# Patient Record
Sex: MALE | Race: BLACK OR AFRICAN AMERICAN | NOT HISPANIC OR LATINO | URBAN - METROPOLITAN AREA
[De-identification: names, ages, dates, MRNs, and addresses within clinical notes are randomized per-mention and may not be internally consistent; named-entity substitution may affect disease eponyms.]

---

## 2021-08-31 ENCOUNTER — EMERGENCY (EMERGENCY)
Facility: HOSPITAL | Age: 46
LOS: 0 days | Discharge: HOME | End: 2021-08-31
Payer: COMMERCIAL

## 2021-08-31 VITALS
HEART RATE: 88 BPM | DIASTOLIC BLOOD PRESSURE: 100 MMHG | HEIGHT: 66 IN | OXYGEN SATURATION: 98 % | WEIGHT: 195.11 LBS | TEMPERATURE: 98 F | SYSTOLIC BLOOD PRESSURE: 144 MMHG | RESPIRATION RATE: 17 BRPM

## 2021-08-31 DIAGNOSIS — M25.531 PAIN IN RIGHT WRIST: ICD-10-CM
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
## 2021-08-31 PROCEDURE — 99283 EMERGENCY DEPT VISIT LOW MDM: CPT

## 2021-08-31 NOTE — ED ADULT TRIAGE NOTE - CHIEF COMPLAINT QUOTE
Pt c/o of right  wrist pain since yesterday. Pt reportd beeing in police custody and  was in handcuff. As per pt pt the cuff was too tibht.

## 2021-08-31 NOTE — ED PROVIDER NOTE - NS ED ROS FT
Constitutional: (-) Fever, (-) Chills, (-) Anorexia  Skin: (+) Wounds  CV: (-) Chest pain  Resp: (-) Cough, (-) Shortness of breath  GI: (-) Abdominal pain, (-) Nausea, (-) Vomiting  : (-) Dysuria  MSK: (-) Weakness, (-) Injuries, (-) Deformities, (-) Myalgias  Neuro: (-) Headache

## 2021-08-31 NOTE — ED PROVIDER NOTE - PATIENT PORTAL LINK FT
You can access the FollowMyHealth Patient Portal offered by [facility] by registering at the following website: [email or web address]. By joining [facility]’s FollowMyHealth portal, you will also be able to view your health information using other applications (apps) compatible with our system.

## 2021-08-31 NOTE — ED ADULT NURSE NOTE - OBJECTIVE STATEMENT
Received patient in FT. Pt c/o of right  wrist pain since yesterday. Pt reported being in police custody and  was in handcuff. As per pt the cuff was too tight.

## 2021-08-31 NOTE — ED PROVIDER NOTE - OBJECTIVE STATEMENT
45y Male with no sig PMHx presents to the ER for wrist pain. Patient states he was hand cuffed yesterday by police now reporting right wrist pain and numbness. Denies use of pain medications. Patient states if it gets worse, he wanted it to be documented so he had proof.

## 2021-08-31 NOTE — ED PROVIDER NOTE - CLINICAL SUMMARY MEDICAL DECISION MAKING FREE TEXT BOX
45y Male with no sig PMHx presents to the ER for wrist pain. hand cuffed yesterday by police now reporting right wrist pain and numbness. Exam as noted above. Discharge with PMD follow up and return precautions.

## 2021-08-31 NOTE — ED ADULT NURSE NOTE - CHIEF COMPLAINT QUOTE
Pt c/o of right  wrist pain since yesterday. Pt reported being in police custody and  was in handcuff. As per pt the cuff was too tight.

## 2021-08-31 NOTE — ED PROVIDER NOTE - NSFOLLOWUPINSTRUCTIONS_ED_ALL_ED_FT
Today you were seen in the ER for right wrist pain.     Take Tylenol 650mg (Two 325 mg pills) every 4-6 hours as needed for pain.    Take Motrin 600 mg every 8 hours as needed for moderate pain  -- take with food.    Advance activity as tolerated.     Continue all previously prescribed medications as directed unless otherwise instructed.     Follow up with your primary care physician in 48-72 hours- bring copies of your results.     Return to the ER for worsening or persistent symptoms, and/or ANY NEW OR CONCERNING SYMPTOMS including but not limited to worsening of pain, swelling, numbness, tingling, weakness, redness, fever or chills..     If you have issues obtaining follow up, please call: [phone] to obtain a doctor or specialist who takes your insurance in your area.  You may call [phone] to make an appointment with the internal medicine clinic.
